# Patient Record
Sex: FEMALE | Race: WHITE | URBAN - METROPOLITAN AREA
[De-identification: names, ages, dates, MRNs, and addresses within clinical notes are randomized per-mention and may not be internally consistent; named-entity substitution may affect disease eponyms.]

---

## 2017-12-16 ENCOUNTER — INPATIENT (INPATIENT)
Facility: HOSPITAL | Age: 82
LOS: 2 days | Discharge: HOME | DRG: 389 | End: 2017-12-19
Attending: SPECIALIST | Admitting: SPECIALIST
Payer: MEDICARE

## 2017-12-16 VITALS
DIASTOLIC BLOOD PRESSURE: 92 MMHG | OXYGEN SATURATION: 90 % | SYSTOLIC BLOOD PRESSURE: 149 MMHG | HEART RATE: 81 BPM | WEIGHT: 143.08 LBS | RESPIRATION RATE: 18 BRPM | TEMPERATURE: 95 F | HEIGHT: 65 IN

## 2017-12-16 DIAGNOSIS — Z98.62 PERIPHERAL VASCULAR ANGIOPLASTY STATUS: Chronic | ICD-10-CM

## 2017-12-16 DIAGNOSIS — I25.10 ATHEROSCLEROTIC HEART DISEASE OF NATIVE CORONARY ARTERY WITHOUT ANGINA PECTORIS: ICD-10-CM

## 2017-12-16 DIAGNOSIS — K56.609 UNSPECIFIED INTESTINAL OBSTRUCTION, UNSPECIFIED AS TO PARTIAL VERSUS COMPLETE OBSTRUCTION: ICD-10-CM

## 2017-12-16 DIAGNOSIS — E78.2 MIXED HYPERLIPIDEMIA: ICD-10-CM

## 2017-12-16 DIAGNOSIS — Z29.9 ENCOUNTER FOR PROPHYLACTIC MEASURES, UNSPECIFIED: ICD-10-CM

## 2017-12-16 DIAGNOSIS — E03.9 HYPOTHYROIDISM, UNSPECIFIED: ICD-10-CM

## 2017-12-16 DIAGNOSIS — Z71.89 OTHER SPECIFIED COUNSELING: ICD-10-CM

## 2017-12-16 DIAGNOSIS — Z98.890 OTHER SPECIFIED POSTPROCEDURAL STATES: Chronic | ICD-10-CM

## 2017-12-16 LAB
ALBUMIN SERPL ELPH-MCNC: 4.3 G/DL — SIGNIFICANT CHANGE UP (ref 3.3–5)
ALP SERPL-CCNC: 84 U/L — SIGNIFICANT CHANGE UP (ref 40–120)
ALT FLD-CCNC: 20 U/L — SIGNIFICANT CHANGE UP (ref 12–78)
AMYLASE P1 CFR SERPL: 92 U/L — SIGNIFICANT CHANGE UP (ref 25–115)
ANION GAP SERPL CALC-SCNC: 15 MMOL/L — SIGNIFICANT CHANGE UP (ref 5–17)
APPEARANCE UR: ABNORMAL
AST SERPL-CCNC: 20 U/L — SIGNIFICANT CHANGE UP (ref 15–37)
BACTERIA # UR AUTO: ABNORMAL
BASE EXCESS BLDA CALC-SCNC: -4.3 MMOL/L — LOW (ref -2–2)
BILIRUB SERPL-MCNC: 0.8 MG/DL — SIGNIFICANT CHANGE UP (ref 0.2–1.2)
BILIRUB UR-MCNC: NEGATIVE — SIGNIFICANT CHANGE UP
BLOOD GAS COMMENTS ARTERIAL: SIGNIFICANT CHANGE UP
BUN SERPL-MCNC: 24 MG/DL — HIGH (ref 7–23)
CALCIUM SERPL-MCNC: 10.5 MG/DL — HIGH (ref 8.5–10.1)
CHLORIDE SERPL-SCNC: 101 MMOL/L — SIGNIFICANT CHANGE UP (ref 96–108)
CO2 SERPL-SCNC: 20 MMOL/L — LOW (ref 22–31)
COLOR SPEC: YELLOW — SIGNIFICANT CHANGE UP
CREAT SERPL-MCNC: 1.2 MG/DL — SIGNIFICANT CHANGE UP (ref 0.5–1.3)
DIFF PNL FLD: ABNORMAL
EPI CELLS # UR: SIGNIFICANT CHANGE UP
GLUCOSE SERPL-MCNC: 179 MG/DL — HIGH (ref 70–99)
GLUCOSE UR QL: 50 MG/DL
HCO3 BLDA-SCNC: 21 MMOL/L — LOW (ref 23–27)
HCT VFR BLD CALC: 44.7 % — SIGNIFICANT CHANGE UP (ref 34.5–45)
HCT VFR BLD CALC: 50.2 % — HIGH (ref 34.5–45)
HGB BLD-MCNC: 14.2 G/DL — SIGNIFICANT CHANGE UP (ref 11.5–15.5)
HGB BLD-MCNC: 15.9 G/DL — HIGH (ref 11.5–15.5)
HOROWITZ INDEX BLDA+IHG-RTO: 21 — SIGNIFICANT CHANGE UP
KETONES UR-MCNC: ABNORMAL
LACTATE SERPL-SCNC: 1.8 MMOL/L — SIGNIFICANT CHANGE UP (ref 0.7–2)
LACTATE SERPL-SCNC: 3.6 MMOL/L — HIGH (ref 0.7–2)
LEUKOCYTE ESTERASE UR-ACNC: ABNORMAL
LIDOCAIN IGE QN: 288 U/L — SIGNIFICANT CHANGE UP (ref 73–393)
LIDOCAIN IGE QN: 706 U/L — HIGH (ref 73–393)
MCHC RBC-ENTMCNC: 29.9 PG — SIGNIFICANT CHANGE UP (ref 27–34)
MCHC RBC-ENTMCNC: 30.5 PG — SIGNIFICANT CHANGE UP (ref 27–34)
MCHC RBC-ENTMCNC: 31.7 GM/DL — LOW (ref 32–36)
MCHC RBC-ENTMCNC: 31.7 GM/DL — LOW (ref 32–36)
MCV RBC AUTO: 94.3 FL — SIGNIFICANT CHANGE UP (ref 80–100)
MCV RBC AUTO: 96 FL — SIGNIFICANT CHANGE UP (ref 80–100)
NITRITE UR-MCNC: NEGATIVE — SIGNIFICANT CHANGE UP
PCO2 BLDA: 29 MMHG — LOW (ref 32–46)
PH BLDA: 7.44 — SIGNIFICANT CHANGE UP (ref 7.35–7.45)
PH UR: 7 — SIGNIFICANT CHANGE UP (ref 5–8)
PLATELET # BLD AUTO: 280 K/UL — SIGNIFICANT CHANGE UP (ref 150–400)
PLATELET # BLD AUTO: 317 K/UL — SIGNIFICANT CHANGE UP (ref 150–400)
PO2 BLDA: 48 MMHG — CRITICAL LOW (ref 74–108)
POTASSIUM SERPL-MCNC: 4.3 MMOL/L — SIGNIFICANT CHANGE UP (ref 3.5–5.3)
POTASSIUM SERPL-SCNC: 4.3 MMOL/L — SIGNIFICANT CHANGE UP (ref 3.5–5.3)
PROT SERPL-MCNC: 8.3 G/DL — SIGNIFICANT CHANGE UP (ref 6–8.3)
PROT UR-MCNC: 25 MG/DL
RBC # BLD: 4.65 M/UL — SIGNIFICANT CHANGE UP (ref 3.8–5.2)
RBC # BLD: 5.33 M/UL — HIGH (ref 3.8–5.2)
RBC # FLD: 13.4 % — SIGNIFICANT CHANGE UP (ref 10.3–14.5)
RBC # FLD: 13.6 % — SIGNIFICANT CHANGE UP (ref 10.3–14.5)
RBC CASTS # UR COMP ASSIST: ABNORMAL /HPF (ref 0–4)
SAO2 % BLDA: 83 % — LOW (ref 92–96)
SODIUM SERPL-SCNC: 136 MMOL/L — SIGNIFICANT CHANGE UP (ref 135–145)
SP GR SPEC: 1 — LOW (ref 1.01–1.02)
TROPONIN I SERPL-MCNC: 0.22 NG/ML — HIGH (ref 0.01–0.04)
UROBILINOGEN FLD QL: NEGATIVE — SIGNIFICANT CHANGE UP
WBC # BLD: 15.8 K/UL — HIGH (ref 3.8–10.5)
WBC # BLD: 19 K/UL — HIGH (ref 3.8–10.5)
WBC # FLD AUTO: 15.8 K/UL — HIGH (ref 3.8–10.5)
WBC # FLD AUTO: 19 K/UL — HIGH (ref 3.8–10.5)
WBC UR QL: ABNORMAL

## 2017-12-16 PROCEDURE — 74177 CT ABD & PELVIS W/CONTRAST: CPT | Mod: 26,59

## 2017-12-16 PROCEDURE — 99285 EMERGENCY DEPT VISIT HI MDM: CPT

## 2017-12-16 PROCEDURE — 74000: CPT | Mod: 26

## 2017-12-16 PROCEDURE — 71010: CPT | Mod: 26

## 2017-12-16 PROCEDURE — 93010 ELECTROCARDIOGRAM REPORT: CPT

## 2017-12-16 PROCEDURE — 71275 CT ANGIOGRAPHY CHEST: CPT | Mod: 26

## 2017-12-16 PROCEDURE — 74174 CTA ABD&PLVS W/CONTRAST: CPT | Mod: 26

## 2017-12-16 RX ORDER — METOPROLOL TARTRATE 50 MG
0 TABLET ORAL
Qty: 0 | Refills: 0 | COMMUNITY

## 2017-12-16 RX ORDER — CIPROFLOXACIN LACTATE 400MG/40ML
400 VIAL (ML) INTRAVENOUS EVERY 12 HOURS
Qty: 0 | Refills: 0 | Status: DISCONTINUED | OUTPATIENT
Start: 2017-12-16 | End: 2017-12-18

## 2017-12-16 RX ORDER — LEVOTHYROXINE SODIUM 125 MCG
25 TABLET ORAL ONCE
Qty: 0 | Refills: 0 | Status: COMPLETED | OUTPATIENT
Start: 2017-12-16 | End: 2017-12-16

## 2017-12-16 RX ORDER — ALPRAZOLAM 0.25 MG
1 TABLET ORAL
Qty: 0 | Refills: 0 | COMMUNITY

## 2017-12-16 RX ORDER — MORPHINE SULFATE 50 MG/1
2 CAPSULE, EXTENDED RELEASE ORAL ONCE
Qty: 0 | Refills: 0 | Status: DISCONTINUED | OUTPATIENT
Start: 2017-12-16 | End: 2017-12-16

## 2017-12-16 RX ORDER — TRAMADOL HYDROCHLORIDE 50 MG/1
0 TABLET ORAL
Qty: 0 | Refills: 0 | COMMUNITY

## 2017-12-16 RX ORDER — LEVOTHYROXINE SODIUM 125 MCG
1 TABLET ORAL
Qty: 0 | Refills: 0 | COMMUNITY

## 2017-12-16 RX ORDER — ONDANSETRON 8 MG/1
4 TABLET, FILM COATED ORAL EVERY 4 HOURS
Qty: 0 | Refills: 0 | Status: DISCONTINUED | OUTPATIENT
Start: 2017-12-16 | End: 2017-12-16

## 2017-12-16 RX ORDER — HEPARIN SODIUM 5000 [USP'U]/ML
5000 INJECTION INTRAVENOUS; SUBCUTANEOUS ONCE
Qty: 0 | Refills: 0 | Status: COMPLETED | OUTPATIENT
Start: 2017-12-16 | End: 2017-12-16

## 2017-12-16 RX ORDER — METOPROLOL TARTRATE 50 MG
2.5 TABLET ORAL EVERY 8 HOURS
Qty: 0 | Refills: 0 | Status: DISCONTINUED | OUTPATIENT
Start: 2017-12-16 | End: 2017-12-18

## 2017-12-16 RX ORDER — POLYETHYLENE GLYCOL 3350 17 G/17G
17 POWDER, FOR SOLUTION ORAL
Qty: 0 | Refills: 0 | COMMUNITY

## 2017-12-16 RX ORDER — OMEPRAZOLE 10 MG/1
0 CAPSULE, DELAYED RELEASE ORAL
Qty: 0 | Refills: 0 | COMMUNITY

## 2017-12-16 RX ORDER — TRAMADOL HYDROCHLORIDE 50 MG/1
1 TABLET ORAL
Qty: 0 | Refills: 0 | COMMUNITY

## 2017-12-16 RX ORDER — HYDROMORPHONE HYDROCHLORIDE 2 MG/ML
0.5 INJECTION INTRAMUSCULAR; INTRAVENOUS; SUBCUTANEOUS ONCE
Qty: 0 | Refills: 0 | Status: DISCONTINUED | OUTPATIENT
Start: 2017-12-16 | End: 2017-12-16

## 2017-12-16 RX ORDER — SODIUM CHLORIDE 9 MG/ML
1000 INJECTION, SOLUTION INTRAVENOUS
Qty: 0 | Refills: 0 | Status: DISCONTINUED | OUTPATIENT
Start: 2017-12-16 | End: 2017-12-17

## 2017-12-16 RX ORDER — METRONIDAZOLE 500 MG
500 TABLET ORAL ONCE
Qty: 0 | Refills: 0 | Status: COMPLETED | OUTPATIENT
Start: 2017-12-16 | End: 2017-12-16

## 2017-12-16 RX ORDER — SODIUM CHLORIDE 9 MG/ML
1000 INJECTION INTRAMUSCULAR; INTRAVENOUS; SUBCUTANEOUS ONCE
Qty: 0 | Refills: 0 | Status: COMPLETED | OUTPATIENT
Start: 2017-12-16 | End: 2017-12-16

## 2017-12-16 RX ORDER — LEVOTHYROXINE SODIUM 125 MCG
25 TABLET ORAL DAILY
Qty: 0 | Refills: 0 | Status: DISCONTINUED | OUTPATIENT
Start: 2017-12-17 | End: 2017-12-18

## 2017-12-16 RX ORDER — ISOSORBIDE MONONITRATE 60 MG/1
1 TABLET, EXTENDED RELEASE ORAL
Qty: 0 | Refills: 0 | COMMUNITY

## 2017-12-16 RX ORDER — OMEPRAZOLE 10 MG/1
1 CAPSULE, DELAYED RELEASE ORAL
Qty: 0 | Refills: 0 | COMMUNITY

## 2017-12-16 RX ORDER — ASPIRIN/CALCIUM CARB/MAGNESIUM 324 MG
1 TABLET ORAL
Qty: 0 | Refills: 0 | COMMUNITY

## 2017-12-16 RX ORDER — ASPIRIN/CALCIUM CARB/MAGNESIUM 324 MG
300 TABLET ORAL DAILY
Qty: 0 | Refills: 0 | Status: DISCONTINUED | OUTPATIENT
Start: 2017-12-16 | End: 2017-12-18

## 2017-12-16 RX ORDER — ISOSORBIDE DINITRATE 5 MG/1
0 TABLET ORAL
Qty: 0 | Refills: 0 | COMMUNITY

## 2017-12-16 RX ORDER — ALPRAZOLAM 0.25 MG
0 TABLET ORAL
Qty: 0 | Refills: 0 | COMMUNITY

## 2017-12-16 RX ORDER — METOPROLOL TARTRATE 50 MG
1 TABLET ORAL
Qty: 0 | Refills: 0 | COMMUNITY

## 2017-12-16 RX ADMIN — Medication 100 MILLIGRAM(S): at 07:19

## 2017-12-16 RX ADMIN — MORPHINE SULFATE 2 MILLIGRAM(S): 50 CAPSULE, EXTENDED RELEASE ORAL at 04:25

## 2017-12-16 RX ADMIN — Medication 2.5 MILLIGRAM(S): at 18:00

## 2017-12-16 RX ADMIN — HYDROMORPHONE HYDROCHLORIDE 0.5 MILLIGRAM(S): 2 INJECTION INTRAMUSCULAR; INTRAVENOUS; SUBCUTANEOUS at 04:30

## 2017-12-16 RX ADMIN — SODIUM CHLORIDE 50 MILLILITER(S): 9 INJECTION, SOLUTION INTRAVENOUS at 11:07

## 2017-12-16 RX ADMIN — ONDANSETRON 4 MILLIGRAM(S): 8 TABLET, FILM COATED ORAL at 11:04

## 2017-12-16 RX ADMIN — SODIUM CHLORIDE 1000 MILLILITER(S): 9 INJECTION INTRAMUSCULAR; INTRAVENOUS; SUBCUTANEOUS at 06:00

## 2017-12-16 RX ADMIN — HEPARIN SODIUM 5000 UNIT(S): 5000 INJECTION INTRAVENOUS; SUBCUTANEOUS at 22:45

## 2017-12-16 RX ADMIN — SODIUM CHLORIDE 50 MILLILITER(S): 9 INJECTION, SOLUTION INTRAVENOUS at 14:19

## 2017-12-16 RX ADMIN — Medication 2.5 MILLIGRAM(S): at 22:45

## 2017-12-16 RX ADMIN — Medication 300 MILLIGRAM(S): at 13:24

## 2017-12-16 RX ADMIN — Medication 25 MICROGRAM(S): at 13:24

## 2017-12-16 RX ADMIN — Medication 200 MILLIGRAM(S): at 18:00

## 2017-12-16 RX ADMIN — Medication 200 MILLIGRAM(S): at 06:03

## 2017-12-16 RX ADMIN — SODIUM CHLORIDE 1000 MILLILITER(S): 9 INJECTION INTRAMUSCULAR; INTRAVENOUS; SUBCUTANEOUS at 06:11

## 2017-12-16 RX ADMIN — HYDROMORPHONE HYDROCHLORIDE 0.5 MILLIGRAM(S): 2 INJECTION INTRAMUSCULAR; INTRAVENOUS; SUBCUTANEOUS at 05:32

## 2017-12-16 RX ADMIN — MORPHINE SULFATE 2 MILLIGRAM(S): 50 CAPSULE, EXTENDED RELEASE ORAL at 05:32

## 2017-12-16 RX ADMIN — SODIUM CHLORIDE 1000 MILLILITER(S): 9 INJECTION INTRAMUSCULAR; INTRAVENOUS; SUBCUTANEOUS at 03:55

## 2017-12-16 NOTE — ED ADULT NURSE REASSESSMENT NOTE - NS ED NURSE REASSESS COMMENT FT1
Spoke with Cas from respiratory regarding ABG. RT states it may be a mixed sample. Was told to place patient on oxygen per ABG result. MD Sotelo made aware. Sat of 98% on venti mask 4L.

## 2017-12-16 NOTE — CONSULT NOTE ADULT - ATTENDING COMMENTS
all of above authored by me   at high risk given age and multiple med problems however medically in optimal cond for emergent surgery  see cardio note  hold zofran with qt prolongation all of above authored by me      had epigastric pain earlier suspect  due to vomiting as occurred after multiple episodes vomiting,   at high risk given age and multiple med problems,  in optimal condition for emergent surgery pending cardio clearance  hold zofran with qt prolongation all of above authored by me      had epigastric pain earlier suspect  due to vomiting as occurred after multiple episodes vomiting,   at high risk given age and multiple med problems,  in optimal condition for emergent surgery once deemed stable by dr wagoner  cardiac enzymes ordered pending  hold zofran with qt prolongation all of above authored by me      had epigastric pain earlier suspect  due to vomiting as occurred after multiple episodes vomiting,   at high risk given age and multiple med problems, however   in optimal condition for emergent surgery once deemed stable by dr wagoner  cardiac enzymes ordered pending  hold zofran with qt prolongation    addendum hcp is daughter   follow up ct scan no obstruction. all of above authored by me      had epigastric pain earlier suspect  due to vomiting as occurred after multiple episodes vomiting,   at high risk given age and multiple med problems, however   in optimal condition for emergent surgery once deemed stable by dr wagoner  cardiac enzymes ordered pending  hold zofran with qt prolongation lipase noted as well.     addendum hcp is daughter   follow up ct scan no obstruction. all of above authored by me      had epigastric pain earlier suspect  due to vomiting as occurred after multiple episodes vomiting,   at high risk given age and multiple med problems, however   in optimal condition for emergent surgery once deemed stable by dr wagoner  cardiac enzymes ordered pending  hold zofran with qt prolongation     addendum hcp is daughter   follow up ct scan noted, no obstruction. distended thick walled gallbladder. elevated lipase earlier now normal. abd us scheduled for am   will follow up  dw dr venegas and bandar.

## 2017-12-16 NOTE — H&P ADULT - NSHPPHYSICALEXAM_GEN_ALL_CORE
VS wnl   Afeb  NAD  Mild diffuse abdominal tenderness without guarding, mild distention  lactate 1.8  CT reviewed with radiologist.  Overnight reading mentioned mass encasing SB in right pelvis not necesssarily cause of SBO.  There is SBO in right pelvis.  Radiologist here now unsure if mass is real

## 2017-12-16 NOTE — PROVIDER CONTACT NOTE (CRITICAL VALUE NOTIFICATION) - ACTION/TREATMENT ORDERED:
pt placed on ventimask post abg draw
Doctors ordered cardiac enzymes, EKG and cardiac echo for the morning.

## 2017-12-16 NOTE — H&P ADULT - HISTORY OF PRESENT ILLNESS
onset 8 PM last night of diffuse abdominal pain and vomiting then retching....   No pain now after morphine and NG insertion,  Had abdominal pain and vomiting with diarrhea 1 month ago lasting one night.  She had diarrhea x 2 yesterday AM, no BM since.

## 2017-12-16 NOTE — CONSULT NOTE ADULT - SUBJECTIVE AND OBJECTIVE BOX
HISTORY OF PRESENT ILLNESS:   this is an 89 year old woman who presented with a small bowel obstruction today.   + abdominal pain and vomiting. She is also complaining of chest discomfort radiating to her back now.  Denied palpitations, lightheadedness, syncope.    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  Hiatal hernia: laparoscopic surgery repair  S/P hysterectomy: ZOË-BSO  S/P appendectomy  S/P tonsillectomy        meds at home:  aspirin 81mg po daily  levothyroxine 50mg po daily  lovastatin 40mg po daily  magnesium 250mg po daily  metoprolol er 25mg po daily  vitamin D daily  xanax .5mg qhs        FAMILY HISTORY:  Family history of colon cancer (Mother): mother, niece, cousin      SOCIAL HISTORY:    x Non-smoker  x social etoh        Allergies  codeine (Unknown)  penicillin (Unknown)  	    REVIEW OF SYSTEMS:    CONSTITUTIONAL: No fever  EYES: No eye pain, visual disturbances, or discharge  ENMT:  No difficulty hearing, tinnitus, vertigo; No sinus or throat pain  NECK: No pain or stiffness  BREASTS: No pain, masses, or nipple discharge  RESPIRATORY: No cough, wheezing, chills or hemoptysis; No Shortness of Breath  CARDIOVASCULAR: see above  GASTROINTESTINAL: see above  GENITOURINARY: No dysuria, frequency, hematuria, or incontinence  NEUROLOGICAL: No headaches, memory loss, loss of strength, numbness, or tremors  SKIN: No itching, burning, rashes, or lesions   LYMPH Nodes: No enlarged glands  ENDOCRINE: No heat or cold intolerance; No hair loss  MUSCULOSKELETAL: No joint pain or swelling; No muscle, back, or extremity pain  PSYCHIATRIC: No depression, anxiety, mood swings, or difficulty sleeping  HEME/LYMPH: No easy bruising, or bleeding gums  ALLERY AND IMMUNOLOGIC: No hives or eczema	      PHYSICAL EXAM:  Daily Height in cm: 165.1 (16 Dec 2017 02:19)    Daily   T(C): 36.3 (12-16-17 @ 07:18), Max: 36.7 (12-16-17 @ 06:19)  HR: 82 (12-16-17 @ 07:18) (81 - 84)  BP: 160/80 (12-16-17 @ 07:18) (149/92 - 164/86)  RR: 17 (12-16-17 @ 07:18) (17 - 22)  SpO2: 100% (12-16-17 @ 07:18) (90% - 100%)  Wt(kg): --  I&O's Summary      Appearance: Normal	  HEENT:   Normal oral mucosa, PERRL, EOMI	  Lymphatic: No lymphadenopathy  Cardiovascular: Normal S1 S2, No JVD, No murmurs, No edema  Respiratory: Lungs clear to auscultation	  Psychiatry: A & O x 3, Mood & affect appropriate  Gastrointestinal:  Soft, Non-tender, + BS	  Skin: No rashes, No ecchymoses, No cyanosis	  Neurologic: Non-focal  Extremities: Normal range of motion, No clubbing, cyanosis or edema  Vascular: Peripheral pulses palpable 2+ bilaterally        ECG:  nsr, poor r wave progression	  	  LABS:	 	                      14.2   15.8  )-----------( 280      ( 16 Dec 2017 09:46 )             44.7     12-16    136  |  101  |  24<H>  ----------------------------<  179<H>  4.3   |  20<L>  |  1.20    Ca    10.5<H>      16 Dec 2017 03:00    TPro  8.3  /  Alb  4.3  /  TBili  0.8  /  DBili  x   /  AST  20  /  ALT  20  /  AlkPhos  84  12-16      Nuclear stress test: 2016--no angina or ischemic ekg changes with adenosine protocol  Mild inferior perfusion defect, which may be artifact.  LV hypertrophy with LVEF 83%.    Echocardiogram: normal lv systolic function.  EF=65%.  No wall motion abnormalities. LVH  Mild TR          ASSESSMENT/PLAN:

## 2017-12-16 NOTE — ED ADULT NURSE NOTE - OBJECTIVE STATEMENT
A/ox4 patient BIBEMS for abdominal pain. Pain 10/10 with nausea and vomiting, abdominal distention. Per patient, pain began at 8pm last night. Afebrile, denies diarrhea. HX of divertic.

## 2017-12-16 NOTE — CONSULT NOTE ADULT - ASSESSMENT
89 female admitted with bowel obstruction sec to tumor  possible emergent surgery  today 89 female admitted with bowel obstruction sec to possible tumor  possible emergent surgery  today

## 2017-12-16 NOTE — ED PROVIDER NOTE - PSH
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

## 2017-12-16 NOTE — CONSULT NOTE ADULT - PROBLEM SELECTOR RECOMMENDATION 4
iv lopressor  rectal asa while np  resume statin when able to take po iv lopressor  rectal asa while npo  resume statin when diet advanced

## 2017-12-16 NOTE — H&P ADULT - PSH
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

## 2017-12-16 NOTE — ED PROVIDER NOTE - OBJECTIVE STATEMENT
88 y/o w/f h/o hiatal hernia,S/P appendectomy  S/P hysterectomy   Hyperlipidemia  Hypothyroidism.  C/O severe abdominal pain, nausea and retching since 8PM. No chest pain, no SOB, no fever, no chills, no diarrhea, no urinary symptoms, no GIB. 89 y/o w/f h/o hiatal hernia, S/P appendectomy  S/P hysterectomy   Hyperlipidemia  Hypothyroidism.  C/O severe abdominal pain, nausea and retching since 8PM. No chest pain, no SOB, no fever, no chills, no diarrhea, no urinary symptoms, no GIB.

## 2017-12-16 NOTE — H&P ADULT - ASSESSMENT
SBO with ? relation to possible mass.  Await med and cardio eval.  Will need laparotomy if SBO persists and especially if pain returns

## 2017-12-16 NOTE — CONSULT NOTE ADULT - SUBJECTIVE AND OBJECTIVE BOX
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 No nausea, vomiting, or hematemesis; No diarrhea or constipation. No melena or hematochezia.  GENITOURINARY: No dysuria, frequency, hematuria, or incontinence  NEUROLOGICAL: No headaches, memory loss, loss of strength, numbness, or tremors  SKIN: No itching, burning, rashes, or lesions   LYMPH NODES: No enlarged glands  ENDOCRINE: No heat or cold intolerance; No hair loss  MUSCULOSKELETAL: No muscle or back pain  PSYCHIATRIC: No depression, anxiety, mood swings, or difficulty sleeping  HEME/LYMPH: No easy bruising, or bleeding gums  ALLERGY AND IMMUNOLOGIC: No hives or eczema        home meds  sumtjrpod 50 romie d  asa 81 mg d  pravastatin 40 mg d  vitamin 2 1000 iu d  magnesium 500 mg d  xanax 0.5 mg d  isosorbide dinitrate 30 mg d  omeprazole 40 mg d  tramadol 50 mg q hs and in am prn      standing meds  aspirin Suppository 300 milliGRAM(s) Rectal daily  ciprofloxacin   IVPB 400 milliGRAM(s) IV Intermittent every 12 hours  dextrose 5% + sodium chloride 0.9%. 1000 milliLiter(s) (50 mL/Hr) IV Continuous <Continuous>    MEDICATIONS  (PRN):  ondansetron Injectable 4 milliGRAM(s) IV Push every 4 hours PRN Nausea and/or Vomiting      Allergies    codeine (Unknown)  penicillin (Unknown)    Intolerances        SOCIAL HISTORY  tobacco:non smoker  alcohol: none  substance use: none  marital status:         PHYSICAL EXAM  Height (cm): 165.1 ( @ 02:19)  Weight (kg): 64.9 ( @ 02:19)  BMI (kg/m2): 23.8 ( @ 02:19)  BSA (m2): 1.72 ( @ 02:19)  Vital Signs Last 24 Hrs  T(C): 36.3 (16 Dec 2017 07:18), Max: 36.7 (16 Dec 2017 06:19)  T(F): 97.4 (16 Dec 2017 07:18), Max: 98 (16 Dec 2017 06:19)  HR: 82 (16 Dec 2017 07:18) (81 - 84)  BP: 160/80 (16 Dec 2017 07:18) (149/92 - 164/86)  BP(mean): --  RR: 17 (16 Dec 2017 07:18) (17 - 22)  SpO2: 100% (16 Dec 2017 07:18) (90% - 100%)    GENERAL: NAD, well-groomed, well-developed  HEAD:  Atraumatic, Normocephalic  EYES: EOMI, PERRLA, conjunctiva and sclera clear  ENMT: No tonsillar erythema, exudates, or enlargement  NECK: Supple, No JVD  NERVOUS SYSTEM:  Alert & Oriented X3, Good concentration; Motor Strength 5/5 B/L upper and lower extremities; DTRs 2+ intact and symmetric  CHEST/LUNG: Clear to percussion bilaterally; No rales, rhonchi, wheezing, or rubs  HEART: Regular rate and rhythm; No murmurs, rubs, or gallops  ABDOMEN: Soft, Nontender, Nondistended; Bowel sounds present  EXTREMITIES:  2+ Peripheral Pulses, No clubbing, cyanosis, or edema  LYMPH: No lymphadenopathy noted  SKIN: No rashes or lesions      LABS:                        14.2   15.8  )-----------( 280      ( 16 Dec 2017 09:46 )             44.7     -    136  |  101  |  24<H>  ----------------------------<  179<H>  4.3   |  20<L>  |  1.20    Ca    10.5<H>      16 Dec 2017 03:00    TPro  8.3  /  Alb  4.3  /  TBili  0.8  /  DBili  x   /  AST  20  /  ALT  20  /  AlkPhos  84  12-16      Urinalysis Basic - ( 16 Dec 2017 06:12 )    Color: Yellow / Appearance: Slightly Turbid / S.005 / pH: x  Gluc: x / Ketone: Trace  / Bili: Negative / Urobili: Negative   Blood: x / Protein: 25 mg/dL / Nitrite: Negative   Leuk Esterase: Moderate / RBC: 6-10 /HPF / WBC 6-10   Sq Epi: x / Non Sq Epi: Occasional / Bacteria: Few      CAPILLARY BLOOD GLUCOSE          RADIOLOGY & ADDITIONAL STUDIES: Patient is a 89y old  Female who presents with a chief complaint of abd pain and vomiting (SBO) (16 Dec 2017 08:38)      HPI: 88 female pmh cad pci stents 4-5 years ago admitted with bowel obstruction sec to tumor    pmd: charlee portillo    PAST MEDICAL & SURGICAL HISTORY:  H/O diverticulitis of colon: x3  Hypothyroidism  Hyperlipidemia  H/O knee surgery: arthroscopy  H/O angioplasty: coronary, with stents 5 years ago  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 no palpitations, dizziness, or leg swelling  GASTROINTESTINAL: AT PRESENT: No abdominal or epigastric pain. No nausea, vomiting,  No diarrhea or constipation. No melena or hematochezia. had n/v earlier  GENITOURINARY: No dysuria, frequency, hematuria, or incontinence  NEUROLOGICAL: No headaches, memory loss, loss of strength, numbness, or tremors  SKIN: No itching, burning, rashes, or lesions   LYMPH NODES: No enlarged glands  ENDOCRINE: No heat or cold intolerance; No hair loss  MUSCULOSKELETAL: No muscle or back pain  PSYCHIATRIC: No depression, anxiety, mood swings, or difficulty sleeping  HEME/LYMPH: No easy bruising, or bleeding gums  ALLERGY AND IMMUNOLOGIC: No hives or eczema        home meds  sumtjrpod 50 romie d  asa 81 mg d  pravastatin 40 mg d  vitamin 2 1000 iu d  magnesium 500 mg d  xanax 0.5 mg d  isosorbide dinitrate 30 mg d  omeprazole 40 mg d  tramadol 50 mg q hs and in am prn      standing meds  aspirin Suppository 300 milliGRAM(s) Rectal daily  ciprofloxacin   IVPB 400 milliGRAM(s) IV Intermittent every 12 hours  dextrose 5% + sodium chloride 0.9%. 1000 milliLiter(s) (50 mL/Hr) IV Continuous <Continuous>    MEDICATIONS  (PRN):  ondansetron Injectable 4 milliGRAM(s) IV Push every 4 hours PRN Nausea and/or Vomiting      Allergies    codeine (Unknown)  penicillin (Unknown)    Intolerances        SOCIAL HISTORY  tobacco:non smoker  alcohol: none  substance use: none  marital status:         PHYSICAL EXAM  Height (cm): 165.1 ( @ 02:19)  Weight (kg): 64.9 ( @ 02:19)  BMI (kg/m2): 23.8 ( @ 02:19)  BSA (m2): 1.72 ( @ 02:19)  Vital Signs Last 24 Hrs  T(C): 36.3 (16 Dec 2017 07:18), Max: 36.7 (16 Dec 2017 06:19)  T(F): 97.4 (16 Dec 2017 07:18), Max: 98 (16 Dec 2017 06:19)  HR: 82 (16 Dec 2017 07:18) (81 - 84)  BP: 160/80 (16 Dec 2017 07:18) (149/92 - 164/86)  BP(mean): --  RR: 17 (16 Dec 2017 07:18) (17 - 22)  SpO2: 100% (16 Dec 2017 07:18) (90% - 100%) on 2 liters o2 95 percent on room air    GENERAL: NAD, well-groomed, well-developed  HEAD:  Atraumatic, Normocephalic  EYES: EOMI, PERRLA, conjunctiva and sclera clear  ENMT: No tonsillar erythema, exudates, or enlargement, ng tube in situ  NECK: Supple, No JVD  NERVOUS SYSTEM:  Alert & Oriented X3, no focal deficits  CHEST/LUNG: Clear to percussion bilaterally; No rales, rhonchi, wheezing, or rubs  HEART: Regular rate and rhythm; No murmurs, rubs, or gallops  ABDOMEN: at present Soft, mildy tender on palpation only  mid abdomen , Nondistended;   EXTREMITIES:  2+ Peripheral Pulses, No clubbing, cyanosis, or edema  LYMPH: No lymphadenopathy noted  SKIN: No rashes or lesions      LABS:                        14.2   15.8  )-----------( 280      ( 16 Dec 2017 09:46 )             44.7         136  |  101  |  24<H>  ----------------------------<  179<H>  4.3   |  20<L>  |  1.20    Ca    10.5<H>      16 Dec 2017 03:00    TPro  8.3  /  Alb  4.3  /  TBili  0.8  /  DBili  x   /  AST  20  /  ALT  20  /  AlkPhos  84        Urinalysis Basic - ( 16 Dec 2017 06:12 )    Color: Yellow / Appearance: Slightly Turbid / S.005 / pH: x  Gluc: x / Ketone: Trace  / Bili: Negative / Urobili: Negative   Blood: x / Protein: 25 mg/dL / Nitrite: Negative   Leuk Esterase: Moderate / RBC: 6-10 /HPF / WBC 6-10   Sq Epi: x / Non Sq Epi: Occasional / Bacteria: Few      CAPILLARY BLOOD GLUCOSE          RADIOLOGY & ADDITIONAL STUDIES: Patient is a 89y old  Female who presents with a chief complaint of abd pain and vomiting (SBO) (16 Dec 2017 08:38)      HPI: 88 female pmh cad pci stents 4-5 years ago admitted with bowel obstruction sec to tumor    pmd: charlee portillo    PAST MEDICAL & SURGICAL HISTORY:  H/O diverticulitis of colon: x3  Hypothyroidism  Hyperlipidemia  H/O knee surgery: arthroscopy  H/O angioplasty: coronary, with stents 5 years ago  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 no palpitations, dizziness, or leg swelling  GASTROINTESTINAL: AT PRESENT: No abdominal or epigastric pain. No nausea, vomiting,  No diarrhea or constipation. No melena or hematochezia. had n/v earlier  GENITOURINARY: No dysuria, frequency, hematuria, or incontinence  NEUROLOGICAL: No headaches, memory loss, loss of strength, numbness, or tremors  SKIN: No itching, burning, rashes, or lesions   LYMPH NODES: No enlarged glands  ENDOCRINE: No heat or cold intolerance; No hair loss  MUSCULOSKELETAL: No muscle or back pain  PSYCHIATRIC: No depression, anxiety, mood swings, or difficulty sleeping  HEME/LYMPH: No easy bruising, or bleeding gums  ALLERGY AND IMMUNOLOGIC: No hives or eczema        home meds  sumtjrpod 50 romie d  asa 81 mg d  pravastatin 40 mg d  vitamin 2 1000 iu d  magnesium 250 mg d  xanax 0.5 mg hs  imdur  30 mg d  omeprazole 40 mg d  tramadol 50 mg q hs and in am prn      standing meds  aspirin Suppository 300 milliGRAM(s) Rectal daily  ciprofloxacin   IVPB 400 milliGRAM(s) IV Intermittent every 12 hours  dextrose 5% + sodium chloride 0.9%. 1000 milliLiter(s) (50 mL/Hr) IV Continuous <Continuous>    MEDICATIONS  (PRN):  ondansetron Injectable 4 milliGRAM(s) IV Push every 4 hours PRN Nausea and/or Vomiting      Allergies    codeine (Unknown)  penicillin (Unknown)    Intolerances        SOCIAL HISTORY  tobacco:non smoker  alcohol: none  substance use: none  marital status:         PHYSICAL EXAM  Height (cm): 165.1 ( @ 02:19)  Weight (kg): 64.9 ( @ 02:19)  BMI (kg/m2): 23.8 ( @ 02:19)  BSA (m2): 1.72 ( @ 02:19)  Vital Signs Last 24 Hrs  T(C): 36.3 (16 Dec 2017 07:18), Max: 36.7 (16 Dec 2017 06:19)  T(F): 97.4 (16 Dec 2017 07:18), Max: 98 (16 Dec 2017 06:19)  HR: 82 (16 Dec 2017 07:18) (81 - 84)  BP: 160/80 (16 Dec 2017 07:18) (149/92 - 164/86)  BP(mean): --  RR: 17 (16 Dec 2017 07:18) (17 - 22)  SpO2: 100% (16 Dec 2017 07:18) (90% - 100%) on 2 liters o2 95 percent on room air    GENERAL: NAD, well-groomed, well-developed  HEAD:  Atraumatic, Normocephalic  EYES: EOMI, PERRLA, conjunctiva and sclera clear  ENMT: No tonsillar erythema, exudates, or enlargement, ng tube in situ  NECK: Supple, No JVD  NERVOUS SYSTEM:  Alert & Oriented X3, no focal deficits  CHEST/LUNG: Clear to percussion bilaterally; No rales, rhonchi, wheezing, or rubs  HEART: Regular rate and rhythm; No murmurs, rubs, or gallops  ABDOMEN: at present Soft, mildy tender on palpation only  mid abdomen , Nondistended;   EXTREMITIES:  2+ Peripheral Pulses, No clubbing, cyanosis, or edema  LYMPH: No lymphadenopathy noted  SKIN: No rashes or lesions      LABS:                        14.2   15.8  )-----------( 280      ( 16 Dec 2017 09:46 )             44.7     12-16    136  |  101  |  24<H>  ----------------------------<  179<H>  4.3   |  20<L>  |  1.20    Ca    10.5<H>      16 Dec 2017 03:00    TPro  8.3  /  Alb  4.3  /  TBili  0.8  /  DBili  x   /  AST  20  /  ALT  20  /  AlkPhos  84  12-16      Urinalysis Basic - ( 16 Dec 2017 06:12 )    Color: Yellow / Appearance: Slightly Turbid / S.005 / pH: x  Gluc: x / Ketone: Trace  / Bili: Negative / Urobili: Negative   Blood: x / Protein: 25 mg/dL / Nitrite: Negative   Leuk Esterase: Moderate / RBC: 6-10 /HPF / WBC 6-10   Sq Epi: x / Non Sq Epi: Occasional / Bacteria: Few      CAPILLARY BLOOD GLUCOSE          RADIOLOGY & ADDITIONAL STUDIES:

## 2017-12-16 NOTE — CONSULT NOTE ADULT - ASSESSMENT
1) small bowel obstruction  management as per surgery  continue beta blockers    2) chest pain radiating to back  ct chest/abd/pelvis--rule out dissection, however, patient's family is refusing at this.  serial cardiac enzymes    3) cad (stents)    4) hypothyroidism  continue present meds    5) hyperchol  continue present meds

## 2017-12-16 NOTE — ED PROVIDER NOTE - SIGNIFICANT NEGATIVE FINDINGS
no headache, no chest pain, no SOB, no palpitations, no vomiting, no diarrhea, no urinary symptoms, no GI bleeding. no neuro changes.

## 2017-12-17 LAB
ALBUMIN SERPL ELPH-MCNC: 3.3 G/DL — SIGNIFICANT CHANGE UP (ref 3.3–5)
ALP SERPL-CCNC: 62 U/L — SIGNIFICANT CHANGE UP (ref 40–120)
ALT FLD-CCNC: 16 U/L — SIGNIFICANT CHANGE UP (ref 12–78)
AMYLASE P1 CFR SERPL: 38 U/L — SIGNIFICANT CHANGE UP (ref 25–115)
ANION GAP SERPL CALC-SCNC: 9 MMOL/L — SIGNIFICANT CHANGE UP (ref 5–17)
AST SERPL-CCNC: 19 U/L — SIGNIFICANT CHANGE UP (ref 15–37)
BILIRUB DIRECT SERPL-MCNC: 0.2 MG/DL — SIGNIFICANT CHANGE UP (ref 0.05–0.2)
BILIRUB INDIRECT FLD-MCNC: 0.5 MG/DL — SIGNIFICANT CHANGE UP (ref 0.2–1)
BILIRUB SERPL-MCNC: 0.7 MG/DL — SIGNIFICANT CHANGE UP (ref 0.2–1.2)
BUN SERPL-MCNC: 11 MG/DL — SIGNIFICANT CHANGE UP (ref 7–23)
CALCIUM SERPL-MCNC: 8.5 MG/DL — SIGNIFICANT CHANGE UP (ref 8.5–10.1)
CHLORIDE SERPL-SCNC: 108 MMOL/L — SIGNIFICANT CHANGE UP (ref 96–108)
CHOLEST SERPL-MCNC: 133 MG/DL — SIGNIFICANT CHANGE UP (ref 10–199)
CK MB BLD-MCNC: 2.4 % — SIGNIFICANT CHANGE UP (ref 0–3.5)
CK MB CFR SERPL CALC: 2.1 NG/ML — SIGNIFICANT CHANGE UP (ref 0–3.6)
CK SERPL-CCNC: 89 U/L — SIGNIFICANT CHANGE UP (ref 26–192)
CO2 SERPL-SCNC: 26 MMOL/L — SIGNIFICANT CHANGE UP (ref 22–31)
CREAT SERPL-MCNC: 1.1 MG/DL — SIGNIFICANT CHANGE UP (ref 0.5–1.3)
CULTURE RESULTS: SIGNIFICANT CHANGE UP
GLUCOSE SERPL-MCNC: 99 MG/DL — SIGNIFICANT CHANGE UP (ref 70–99)
HCT VFR BLD CALC: 43.3 % — SIGNIFICANT CHANGE UP (ref 34.5–45)
HDLC SERPL-MCNC: 43 MG/DL — SIGNIFICANT CHANGE UP (ref 40–125)
HGB BLD-MCNC: 13.6 G/DL — SIGNIFICANT CHANGE UP (ref 11.5–15.5)
LIDOCAIN IGE QN: 99 U/L — SIGNIFICANT CHANGE UP (ref 73–393)
LIPID PNL WITH DIRECT LDL SERPL: 65 MG/DL — SIGNIFICANT CHANGE UP
MCHC RBC-ENTMCNC: 30.2 PG — SIGNIFICANT CHANGE UP (ref 27–34)
MCHC RBC-ENTMCNC: 31.3 GM/DL — LOW (ref 32–36)
MCV RBC AUTO: 96.4 FL — SIGNIFICANT CHANGE UP (ref 80–100)
PLATELET # BLD AUTO: 270 K/UL — SIGNIFICANT CHANGE UP (ref 150–400)
POTASSIUM SERPL-MCNC: 3.7 MMOL/L — SIGNIFICANT CHANGE UP (ref 3.5–5.3)
POTASSIUM SERPL-SCNC: 3.7 MMOL/L — SIGNIFICANT CHANGE UP (ref 3.5–5.3)
PROT SERPL-MCNC: 6.7 G/DL — SIGNIFICANT CHANGE UP (ref 6–8.3)
RBC # BLD: 4.49 M/UL — SIGNIFICANT CHANGE UP (ref 3.8–5.2)
RBC # FLD: 14 % — SIGNIFICANT CHANGE UP (ref 10.3–14.5)
SODIUM SERPL-SCNC: 143 MMOL/L — SIGNIFICANT CHANGE UP (ref 135–145)
SPECIMEN SOURCE: SIGNIFICANT CHANGE UP
TOTAL CHOLESTEROL/HDL RATIO MEASUREMENT: 3.1 RATIO — LOW (ref 3.3–7.1)
TRIGL SERPL-MCNC: 127 MG/DL — SIGNIFICANT CHANGE UP (ref 10–149)
TROPONIN I SERPL-MCNC: 0.21 NG/ML — HIGH (ref 0.01–0.04)
TROPONIN I SERPL-MCNC: 0.22 NG/ML — HIGH (ref 0.01–0.04)
WBC # BLD: 11.8 K/UL — HIGH (ref 3.8–10.5)
WBC # FLD AUTO: 11.8 K/UL — HIGH (ref 3.8–10.5)

## 2017-12-17 PROCEDURE — 93010 ELECTROCARDIOGRAM REPORT: CPT

## 2017-12-17 PROCEDURE — 76705 ECHO EXAM OF ABDOMEN: CPT | Mod: 26

## 2017-12-17 RX ADMIN — Medication 200 MILLIGRAM(S): at 18:18

## 2017-12-17 RX ADMIN — Medication 2.5 MILLIGRAM(S): at 05:03

## 2017-12-17 RX ADMIN — Medication 300 MILLIGRAM(S): at 13:06

## 2017-12-17 RX ADMIN — Medication 2.5 MILLIGRAM(S): at 21:57

## 2017-12-17 RX ADMIN — Medication 200 MILLIGRAM(S): at 05:03

## 2017-12-17 RX ADMIN — Medication 25 MICROGRAM(S): at 05:03

## 2017-12-17 NOTE — PROGRESS NOTE ADULT - PROBLEM SELECTOR PLAN 1
seems more likely to have been ileus, perhaps secondary to acute biliary issues  await HIDA.  cardio eval noted and appreciated.  there are no medical contraindications to surgical intervention

## 2017-12-17 NOTE — PROGRESS NOTE ADULT - PROBLEM SELECTOR PLAN 5
health care proxy forms placed in chart.  prognosis and long term care plans discussed with pt and family.  delineated agent listed.

## 2017-12-17 NOTE — PROGRESS NOTE ADULT - SUBJECTIVE AND OBJECTIVE BOX
The patient has no chest pain, no SOB.  She is still having some nausea.  No back pain.    Appearance: Normal	  HEENT:   Normal oral mucosa, PERRL, EOMI	  Neck:  No bruits; No JVD  Cardiovascular: Normal S1 S2 regular, 2/6 systolic best at apex  Respiratory: A/E good bilateral, bronchovesicular BS  Gastrointestinal:  Soft, no distention, + BS, mild tenderness to palpation  Skin: No rashes, No ecchymoses, No cyanosis	  Neurologic: no focal neurologic deficits  Extremities: Normal range of motion, No clubbing, cyanosis or edema  Vascular: Peripheral pulses palpable 2+ bilaterally

## 2017-12-17 NOTE — PROGRESS NOTE ADULT - SUBJECTIVE AND OBJECTIVE BOX
Afeb VS wnl  No nausea or pain.  Passing flatus.  Abd: nontender, nondistended  WBC down to 11.8  CT shows SBO resolved  Sono : thickwalled GB with no stones  P: clear liquids, HIDA scan in AM

## 2017-12-17 NOTE — PROGRESS NOTE ADULT - SUBJECTIVE AND OBJECTIVE BOX
Patient is a 89y old  Female who presents with a chief complaint of abd pain and vomiting (SBO) (16 Dec 2017 08:38)  feels well presently.      INTERVAL HPI/OVERNIGHT EVENTS:  T(C): 36.8 (17 @ 07:37), Max: 37.1 (17 @ 16:40)  HR: 62 (17 @ 07:37) (62 - 84)  BP: 137/80 (17 @ 07:37) (118/70 - 162/84)  RR: 16 (17 @ 07:37) (15 - 16)  SpO2: 94% (17 @ 07:37) (92% - 100%)  Wt(kg): --  I&O's Summary    16 Dec 2017 07:01  -  17 Dec 2017 07:00  --------------------------------------------------------  IN: 1000 mL / OUT: 550 mL / NET: 450 mL        LABS:                        14.2   15.8  )-----------( 280      ( 16 Dec 2017 09:46 )             44.7     12-16    136  |  101  |  24<H>  ----------------------------<  179<H>  4.3   |  20<L>  |  1.20    Ca    10.5<H>      16 Dec 2017 03:00    TPro  8.3  /  Alb  4.3  /  TBili  0.8  /  DBili  x   /  AST  20  /  ALT  20  /  AlkPhos  84  12-16      Urinalysis Basic - ( 16 Dec 2017 06:12 )    Color: Yellow / Appearance: Slightly Turbid / S.005 / pH: x  Gluc: x / Ketone: Trace  / Bili: Negative / Urobili: Negative   Blood: x / Protein: 25 mg/dL / Nitrite: Negative   Leuk Esterase: Moderate / RBC: 6-10 /HPF / WBC 6-10   Sq Epi: x / Non Sq Epi: Occasional / Bacteria: Few      CAPILLARY BLOOD GLUCOSE        ABG - ( 16 Dec 2017 04:07 )  pH: 7.44  /  pCO2: 29    /  pO2: 48    / HCO3: 21    / Base Excess: -4.3  /  SaO2: 83                  Urinalysis Basic - ( 16 Dec 2017 06:12 )    Color: Yellow / Appearance: Slightly Turbid / S.005 / pH: x  Gluc: x / Ketone: Trace  / Bili: Negative / Urobili: Negative   Blood: x / Protein: 25 mg/dL / Nitrite: Negative   Leuk Esterase: Moderate / RBC: 6-10 /HPF / WBC 6-10   Sq Epi: x / Non Sq Epi: Occasional / Bacteria: Few        MEDICATIONS  (STANDING):  aspirin Suppository 300 milliGRAM(s) Rectal daily  ciprofloxacin   IVPB 400 milliGRAM(s) IV Intermittent every 12 hours  dextrose 5% + sodium chloride 0.9%. 1000 milliLiter(s) (50 mL/Hr) IV Continuous <Continuous>  levothyroxine Injectable 25 MICROGram(s) IV Push daily  metoprolol    tartrate Injectable 2.5 milliGRAM(s) IV Push every 8 hours    MEDICATIONS  (PRN):      REVIEW OF SYSTEMS:  CONSTITUTIONAL: No fever, weight loss, or fatigue  EYES: No eye pain, visual disturbances, or discharge  ENMT:  No difficulty hearing, tinnitus, vertigo; No sinus or throat pain  NECK: No pain or stiffness  RESPIRATORY: No cough, wheezing, chills or hemoptysis; No shortness of breath  CARDIOVASCULAR: No chest pain, palpitations, dizziness, or leg swelling  GASTROINTESTINAL: no further abd pain presently  GENITOURINARY: No dysuria, frequency, hematuria, or incontinence  NEUROLOGICAL: No headaches, memory loss, loss of strength, numbness, or tremors  SKIN: No itching, burning, rashes, or lesions   LYMPH NODES: No enlarged glands  ENDOCRINE: No heat or cold intolerance; No hair loss  MUSCULOSKELETAL: No joint pain or swelling; No muscle, back, or extremity pain  PSYCHIATRIC: No depression, anxiety, mood swings, or difficulty sleeping  HEME/LYMPH: No easy bruising, or bleeding gums  ALLERY AND IMMUNOLOGIC: No hives or eczema    RADIOLOGY & ADDITIONAL TESTS:    Imaging Personally Reviewed:  [ ] YES  [ ] NO    Consultant(s) Notes Reviewed:  [ ] YES  [ ] NO    PHYSICAL EXAM:  GENERAL: NAD, well-groomed, well-developed  HEAD:  Atraumatic, Normocephalic  EYES: EOMI, PERRLA, conjunctiva and sclera clear  ENMT: No tonsillar erythema, exudates, or enlargement; Moist mucous membranes, Good dentition, No lesions  NECK: Supple, No JVD, Normal thyroid  NERVOUS SYSTEM:  Alert & Oriented X3, Good concentration; Motor Strength 5/5 B/L upper and lower extremities; DTRs 2+ intact and symmetric  CHEST/LUNG: Clear to percussion bilaterally; No rales, rhonchi, wheezing, or rubs  HEART: Regular rate and rhythm; No murmurs, rubs, or gallops  ABDOMEN: Soft, Nontender, Nondistended; Bowel sounds present  EXTREMITIES:  2+ Peripheral Pulses, No clubbing, cyanosis, or edema  LYMPH: No lymphadenopathy noted  SKIN: No rashes or lesions    Care Discussed with Consultants/Other Providers [ ] YES  [ ] NO

## 2017-12-17 NOTE — PROGRESS NOTE ADULT - ASSESSMENT
1) small bowel obstruction  The pateint is still having some symptoms and signa of GB issues and possible SBO  F/U with surgery for possible surgical intervention.  The patient is cardiac cleared at low risk for General Anesthesia  Continue B-Blocker Rx.    2) chest pain radiating to back  The symptoms have resolved.  She has a history of back issues; furthermore, the GB problem may cause pain radiating to the back  ct chest/abd/pelvis--rule out dissection, however, patient's family is refusing at this.  serial cardiac enzymes    3) cad (stents)  No active symptoms of angina.  The small positive troponin I may be related to demand ischemia versus false + with GI obstruction    4) hypothyroidism  continue present meds    5) hyperlipiemia  continue present meds    Cardiac cleared at low risk for general anesthesia

## 2017-12-18 LAB
ALBUMIN SERPL ELPH-MCNC: 3.1 G/DL — LOW (ref 3.3–5)
ALP SERPL-CCNC: 60 U/L — SIGNIFICANT CHANGE UP (ref 40–120)
ALT FLD-CCNC: 15 U/L — SIGNIFICANT CHANGE UP (ref 12–78)
ANION GAP SERPL CALC-SCNC: 7 MMOL/L — SIGNIFICANT CHANGE UP (ref 5–17)
AST SERPL-CCNC: 19 U/L — SIGNIFICANT CHANGE UP (ref 15–37)
BILIRUB SERPL-MCNC: 0.7 MG/DL — SIGNIFICANT CHANGE UP (ref 0.2–1.2)
BUN SERPL-MCNC: 11 MG/DL — SIGNIFICANT CHANGE UP (ref 7–23)
CALCIUM SERPL-MCNC: 8.8 MG/DL — SIGNIFICANT CHANGE UP (ref 8.5–10.1)
CHLORIDE SERPL-SCNC: 109 MMOL/L — HIGH (ref 96–108)
CO2 SERPL-SCNC: 28 MMOL/L — SIGNIFICANT CHANGE UP (ref 22–31)
CREAT SERPL-MCNC: 0.94 MG/DL — SIGNIFICANT CHANGE UP (ref 0.5–1.3)
GLUCOSE SERPL-MCNC: 87 MG/DL — SIGNIFICANT CHANGE UP (ref 70–99)
HCT VFR BLD CALC: 40.5 % — SIGNIFICANT CHANGE UP (ref 34.5–45)
HGB BLD-MCNC: 12.8 G/DL — SIGNIFICANT CHANGE UP (ref 11.5–15.5)
MCHC RBC-ENTMCNC: 30.1 PG — SIGNIFICANT CHANGE UP (ref 27–34)
MCHC RBC-ENTMCNC: 31.7 GM/DL — LOW (ref 32–36)
MCV RBC AUTO: 94.9 FL — SIGNIFICANT CHANGE UP (ref 80–100)
PLATELET # BLD AUTO: 250 K/UL — SIGNIFICANT CHANGE UP (ref 150–400)
POTASSIUM SERPL-MCNC: 3.5 MMOL/L — SIGNIFICANT CHANGE UP (ref 3.5–5.3)
POTASSIUM SERPL-SCNC: 3.5 MMOL/L — SIGNIFICANT CHANGE UP (ref 3.5–5.3)
PROT SERPL-MCNC: 6.1 G/DL — SIGNIFICANT CHANGE UP (ref 6–8.3)
RBC # BLD: 4.27 M/UL — SIGNIFICANT CHANGE UP (ref 3.8–5.2)
RBC # FLD: 13.6 % — SIGNIFICANT CHANGE UP (ref 10.3–14.5)
SODIUM SERPL-SCNC: 144 MMOL/L — SIGNIFICANT CHANGE UP (ref 135–145)
WBC # BLD: 9.2 K/UL — SIGNIFICANT CHANGE UP (ref 3.8–10.5)
WBC # FLD AUTO: 9.2 K/UL — SIGNIFICANT CHANGE UP (ref 3.8–10.5)

## 2017-12-18 PROCEDURE — 78226 HEPATOBILIARY SYSTEM IMAGING: CPT | Mod: 26

## 2017-12-18 RX ORDER — LEVOTHYROXINE SODIUM 125 MCG
50 TABLET ORAL DAILY
Qty: 0 | Refills: 0 | Status: DISCONTINUED | OUTPATIENT
Start: 2017-12-19 | End: 2017-12-19

## 2017-12-18 RX ORDER — PANTOPRAZOLE SODIUM 20 MG/1
40 TABLET, DELAYED RELEASE ORAL
Qty: 0 | Refills: 0 | Status: DISCONTINUED | OUTPATIENT
Start: 2017-12-18 | End: 2017-12-19

## 2017-12-18 RX ORDER — ALPRAZOLAM 0.25 MG
0.5 TABLET ORAL AT BEDTIME
Qty: 0 | Refills: 0 | Status: DISCONTINUED | OUTPATIENT
Start: 2017-12-18 | End: 2017-12-19

## 2017-12-18 RX ORDER — LEVOTHYROXINE SODIUM 125 MCG
25 TABLET ORAL DAILY
Qty: 0 | Refills: 0 | Status: DISCONTINUED | OUTPATIENT
Start: 2017-12-18 | End: 2017-12-18

## 2017-12-18 RX ORDER — TRAMADOL HYDROCHLORIDE 50 MG/1
50 TABLET ORAL AT BEDTIME
Qty: 0 | Refills: 0 | Status: DISCONTINUED | OUTPATIENT
Start: 2017-12-18 | End: 2017-12-19

## 2017-12-18 RX ORDER — TRAMADOL HYDROCHLORIDE 50 MG/1
50 TABLET ORAL DAILY
Qty: 0 | Refills: 0 | Status: DISCONTINUED | OUTPATIENT
Start: 2017-12-18 | End: 2017-12-19

## 2017-12-18 RX ORDER — ATORVASTATIN CALCIUM 80 MG/1
10 TABLET, FILM COATED ORAL AT BEDTIME
Qty: 0 | Refills: 0 | Status: DISCONTINUED | OUTPATIENT
Start: 2017-12-18 | End: 2017-12-19

## 2017-12-18 RX ORDER — ISOSORBIDE MONONITRATE 60 MG/1
30 TABLET, EXTENDED RELEASE ORAL DAILY
Qty: 0 | Refills: 0 | Status: DISCONTINUED | OUTPATIENT
Start: 2017-12-18 | End: 2017-12-19

## 2017-12-18 RX ORDER — ASPIRIN/CALCIUM CARB/MAGNESIUM 324 MG
81 TABLET ORAL DAILY
Qty: 0 | Refills: 0 | Status: DISCONTINUED | OUTPATIENT
Start: 2017-12-18 | End: 2017-12-19

## 2017-12-18 RX ADMIN — TRAMADOL HYDROCHLORIDE 50 MILLIGRAM(S): 50 TABLET ORAL at 21:03

## 2017-12-18 RX ADMIN — Medication 81 MILLIGRAM(S): at 21:06

## 2017-12-18 RX ADMIN — ATORVASTATIN CALCIUM 10 MILLIGRAM(S): 80 TABLET, FILM COATED ORAL at 21:04

## 2017-12-18 RX ADMIN — Medication 200 MILLIGRAM(S): at 06:57

## 2017-12-18 RX ADMIN — Medication 25 MICROGRAM(S): at 06:57

## 2017-12-18 RX ADMIN — Medication 0.5 MILLIGRAM(S): at 21:04

## 2017-12-18 RX ADMIN — Medication 2.5 MILLIGRAM(S): at 06:56

## 2017-12-18 RX ADMIN — TRAMADOL HYDROCHLORIDE 50 MILLIGRAM(S): 50 TABLET ORAL at 21:38

## 2017-12-18 NOTE — PROGRESS NOTE ADULT - SUBJECTIVE AND OBJECTIVE BOX
[facility]    Patient in bed, denies CP/SOB, reports constipation & poor PO intake      Vital Signs Last 24 Hrs  T(C): 36.7 (18 Dec 2017 16:43), Max: 36.9 (18 Dec 2017 07:40)  T(F): 98 (18 Dec 2017 16:43), Max: 98.4 (18 Dec 2017 07:40)  HR: 65 (18 Dec 2017 16:43) (54 - 73)  BP: 135/73 (18 Dec 2017 16:43) (128/76 - 156/78)  BP(mean): --  RR: 16 (18 Dec 2017 16:43) (16 - 16)  SpO2: 93% (18 Dec 2017 16:43) (93% - 94%)    Rhythm:  OM    Physical Exam    Mental Status:  A & O x 3  Neck:  no JVD  Heart:  RRR  Lungs:  clear B/L  Abdomen:  soft, NT  Extremities:  no edema  Skin:  no rash  Muscular/Skeletal:  moves all extremities    MEDICATIONS  (STANDING):  ALPRAZolam 0.5 milliGRAM(s) Oral at bedtime  aspirin enteric coated 81 milliGRAM(s) Oral daily  atorvastatin 10 milliGRAM(s) Oral at bedtime  isosorbide   mononitrate ER Tablet (IMDUR) 30 milliGRAM(s) Oral daily  levothyroxine 50 MICROGram(s) Oral daily  pantoprazole    Tablet 40 milliGRAM(s) Oral before breakfast  traMADol 50 milliGRAM(s) Oral at bedtime    MEDICATIONS  (PRN):  traMADol 50 milliGRAM(s) Oral daily PRN Mild Pain (1 - 3)      LABS:	 	    CARDIAC MARKERS:  Troponin I, Serum: .217 ng/mL (12-17 @ 11:16)  Troponin I, Serum: .209 ng/mL (12-17 @ 02:19)  Troponin I, Serum: .221 ng/mL (12-16 @ 19:19)  Troponin I, Serum: .023 ng/mL (12-16 @ 09:46)                                  12.8   9.2   )-----------( 250      ( 18 Dec 2017 07:53 )             40.5     12-18 @ 07:53  WBC  9.2  Hgb  12.8  HCT  40.5  PLT  250  12-17 @ 11:16  WBC  11.8  Hgb  13.6  HCT  43.3  PLT  270  12-16 @ 09:46  WBC  15.8  Hgb  14.2  HCT  44.7  PLT  280  12-16 @ 03:00  WBC  19.0  Hgb  15.9  HCT  50.2  PLT  317      12-18    144  |  109<H>  |  11  ----------------------------<  87  3.5   |  28  |  0.94    Ca    8.8      18 Dec 2017 07:53    TPro  6.1  /  Alb  3.1<L>  /  TBili  0.7  /  DBili  x   /  AST  19  /  ALT  15  /  AlkPhos  60  12-18 12-18 @ 07:53  BUN  11  Cr   0.94  Gluc 87  Na   144  K    3.5  Cl   109  CO2  28  Mg   --  12-17 @ 11:16  BUN  11  Cr   1.10  Gluc 99  Na   143  K    3.7  Cl   108  CO2  26  Mg   --  12-16 @ 03:00  BUN  24  Cr   1.20  Gluc 179  Na   136  K    4.3  Cl   101  CO2  20  Mg   --      proBNP:   Lipid Profile:   HgA1c:   TSH:         Radiology: ?    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

## 2017-12-18 NOTE — PROGRESS NOTE ADULT - SUBJECTIVE AND OBJECTIVE BOX
Afeb VS wnl  No pain . Tolerated clears liquids. No nausea  Passing flatus. No BM yet  Abd: nontneder, nondistended  WBC down to 9  HIDA scan normal.  P: regular diet; discharge tomorrow if continues to do well

## 2017-12-18 NOTE — PROGRESS NOTE ADULT - SUBJECTIVE AND OBJECTIVE BOX
Patient is a 89y old  Female who presents with a chief complaint of severe upper abdominal pain (18 Dec 2017 04:24)  feels better, hida neg      INTERVAL HPI/OVERNIGHT EVENTS:  T(C): 36.6 (12-18-17 @ 13:00), Max: 37.1 (12-17-17 @ 14:40)  HR: 73 (12-18-17 @ 13:00) (54 - 73)  BP: 156/78 (12-18-17 @ 13:00) (114/71 - 156/78)  RR: 16 (12-18-17 @ 13:00) (16 - 16)  SpO2: 94% (12-18-17 @ 13:00) (93% - 94%)  Wt(kg): --  I&O's Summary    17 Dec 2017 07:01  -  18 Dec 2017 07:00  --------------------------------------------------------  IN: 200 mL / OUT: 0 mL / NET: 200 mL    18 Dec 2017 07:01  -  18 Dec 2017 14:32  --------------------------------------------------------  IN: 240 mL / OUT: 0 mL / NET: 240 mL        LABS:                        12.8   9.2   )-----------( 250      ( 18 Dec 2017 07:53 )             40.5     12-18    144  |  109<H>  |  11  ----------------------------<  87  3.5   |  28  |  0.94    Ca    8.8      18 Dec 2017 07:53    TPro  6.1  /  Alb  3.1<L>  /  TBili  0.7  /  DBili  x   /  AST  19  /  ALT  15  /  AlkPhos  60  12-18                  MEDICATIONS  (STANDING):  ALPRAZolam 0.5 milliGRAM(s) Oral at bedtime  aspirin enteric coated 81 milliGRAM(s) Oral daily  atorvastatin 10 milliGRAM(s) Oral at bedtime  isosorbide   mononitrate ER Tablet (IMDUR) 30 milliGRAM(s) Oral daily  levothyroxine 50 MICROGram(s) Oral daily  pantoprazole    Tablet 40 milliGRAM(s) Oral before breakfast  traMADol 50 milliGRAM(s) Oral at bedtime    MEDICATIONS  (PRN):  traMADol 50 milliGRAM(s) Oral daily PRN Mild Pain (1 - 3)      REVIEW OF SYSTEMS:  CONSTITUTIONAL: No fever, weight loss, or fatigue  EYES: No eye pain, visual disturbances, or discharge  ENMT:  No difficulty hearing, tinnitus, vertigo; No sinus or throat pain  NECK: No pain or stiffness  RESPIRATORY: No cough, wheezing, chills or hemoptysis; No shortness of breath  CARDIOVASCULAR: No chest pain, palpitations, dizziness, or leg swelling  GASTROINTESTINAL: No abdominal or epigastric pain. No nausea, vomiting, or hematemesis; No diarrhea or constipation. No melena or hematochezia.  GENITOURINARY: No dysuria, frequency, hematuria, or incontinence  NEUROLOGICAL: No headaches, memory loss, loss of strength, numbness, or tremors  SKIN: No itching, burning, rashes, or lesions   LYMPH NODES: No enlarged glands  ENDOCRINE: No heat or cold intolerance; No hair loss  MUSCULOSKELETAL: No joint pain or swelling; No muscle, back, or extremity pain  PSYCHIATRIC: No depression, anxiety, mood swings, or difficulty sleeping  HEME/LYMPH: No easy bruising, or bleeding gums  ALLERY AND IMMUNOLOGIC: No hives or eczema    RADIOLOGY & ADDITIONAL TESTS:    Imaging Personally Reviewed:  [ ] YES  [ ] NO    Consultant(s) Notes Reviewed:  [ ] YES  [ ] NO    PHYSICAL EXAM:  GENERAL: NAD, well-groomed, well-developed  HEAD:  Atraumatic, Normocephalic  EYES: EOMI, PERRLA, conjunctiva and sclera clear  ENMT: No tonsillar erythema, exudates, or enlargement; Moist mucous membranes, Good dentition, No lesions  NECK: Supple, No JVD, Normal thyroid  NERVOUS SYSTEM:  Alert & Oriented X3, Good concentration; Motor Strength 5/5 B/L upper and lower extremities; DTRs 2+ intact and symmetric  CHEST/LUNG: Clear to percussion bilaterally; No rales, rhonchi, wheezing, or rubs  HEART: Regular rate and rhythm; No murmurs, rubs, or gallops  ABDOMEN: Soft, Nontender, Nondistended; Bowel sounds present  EXTREMITIES:  2+ Peripheral Pulses, No clubbing, cyanosis, or edema  LYMPH: No lymphadenopathy noted  SKIN: No rashes or lesions    Care Discussed with Consultants/Other Providers [ ] YES  [ ] NO

## 2017-12-19 ENCOUNTER — TRANSCRIPTION ENCOUNTER (OUTPATIENT)
Age: 82
End: 2017-12-19

## 2017-12-19 VITALS
HEART RATE: 78 BPM | OXYGEN SATURATION: 93 % | DIASTOLIC BLOOD PRESSURE: 62 MMHG | SYSTOLIC BLOOD PRESSURE: 96 MMHG | RESPIRATION RATE: 16 BRPM | TEMPERATURE: 98 F

## 2017-12-19 LAB
BLD GP AB SCN SERPL QL: SIGNIFICANT CHANGE UP
HCT VFR BLD CALC: 43.6 % — SIGNIFICANT CHANGE UP (ref 34.5–45)
HGB BLD-MCNC: 13.8 G/DL — SIGNIFICANT CHANGE UP (ref 11.5–15.5)
MCHC RBC-ENTMCNC: 30.3 PG — SIGNIFICANT CHANGE UP (ref 27–34)
MCHC RBC-ENTMCNC: 31.5 GM/DL — LOW (ref 32–36)
MCV RBC AUTO: 95.9 FL — SIGNIFICANT CHANGE UP (ref 80–100)
PLATELET # BLD AUTO: 264 K/UL — SIGNIFICANT CHANGE UP (ref 150–400)
RBC # BLD: 4.55 M/UL — SIGNIFICANT CHANGE UP (ref 3.8–5.2)
RBC # FLD: 13.5 % — SIGNIFICANT CHANGE UP (ref 10.3–14.5)
WBC # BLD: 10.2 K/UL — SIGNIFICANT CHANGE UP (ref 3.8–10.5)
WBC # FLD AUTO: 10.2 K/UL — SIGNIFICANT CHANGE UP (ref 3.8–10.5)

## 2017-12-19 PROCEDURE — 36600 WITHDRAWAL OF ARTERIAL BLOOD: CPT

## 2017-12-19 PROCEDURE — 96367 TX/PROPH/DG ADDL SEQ IV INF: CPT

## 2017-12-19 PROCEDURE — 87040 BLOOD CULTURE FOR BACTERIA: CPT

## 2017-12-19 PROCEDURE — 83690 ASSAY OF LIPASE: CPT

## 2017-12-19 PROCEDURE — 82803 BLOOD GASES ANY COMBINATION: CPT

## 2017-12-19 PROCEDURE — 80061 LIPID PANEL: CPT

## 2017-12-19 PROCEDURE — 78226 HEPATOBILIARY SYSTEM IMAGING: CPT

## 2017-12-19 PROCEDURE — 80076 HEPATIC FUNCTION PANEL: CPT

## 2017-12-19 PROCEDURE — 74020: CPT | Mod: 26

## 2017-12-19 PROCEDURE — 87086 URINE CULTURE/COLONY COUNT: CPT

## 2017-12-19 PROCEDURE — 93005 ELECTROCARDIOGRAM TRACING: CPT

## 2017-12-19 PROCEDURE — 83605 ASSAY OF LACTIC ACID: CPT

## 2017-12-19 PROCEDURE — 80048 BASIC METABOLIC PNL TOTAL CA: CPT

## 2017-12-19 PROCEDURE — 82553 CREATINE MB FRACTION: CPT

## 2017-12-19 PROCEDURE — 86901 BLOOD TYPING SEROLOGIC RH(D): CPT

## 2017-12-19 PROCEDURE — 82150 ASSAY OF AMYLASE: CPT

## 2017-12-19 PROCEDURE — 86900 BLOOD TYPING SEROLOGIC ABO: CPT

## 2017-12-19 PROCEDURE — 85027 COMPLETE CBC AUTOMATED: CPT

## 2017-12-19 PROCEDURE — 74018 RADEX ABDOMEN 1 VIEW: CPT

## 2017-12-19 PROCEDURE — 96376 TX/PRO/DX INJ SAME DRUG ADON: CPT

## 2017-12-19 PROCEDURE — 84484 ASSAY OF TROPONIN QUANT: CPT

## 2017-12-19 PROCEDURE — 71275 CT ANGIOGRAPHY CHEST: CPT

## 2017-12-19 PROCEDURE — 76705 ECHO EXAM OF ABDOMEN: CPT

## 2017-12-19 PROCEDURE — 74174 CTA ABD&PLVS W/CONTRAST: CPT

## 2017-12-19 PROCEDURE — 86850 RBC ANTIBODY SCREEN: CPT

## 2017-12-19 PROCEDURE — 36415 COLL VENOUS BLD VENIPUNCTURE: CPT

## 2017-12-19 PROCEDURE — 71045 X-RAY EXAM CHEST 1 VIEW: CPT

## 2017-12-19 PROCEDURE — 96375 TX/PRO/DX INJ NEW DRUG ADDON: CPT

## 2017-12-19 PROCEDURE — A9537: CPT

## 2017-12-19 PROCEDURE — 96365 THER/PROPH/DIAG IV INF INIT: CPT | Mod: 59

## 2017-12-19 PROCEDURE — 99285 EMERGENCY DEPT VISIT HI MDM: CPT | Mod: 25

## 2017-12-19 PROCEDURE — 81001 URINALYSIS AUTO W/SCOPE: CPT

## 2017-12-19 PROCEDURE — 80053 COMPREHEN METABOLIC PANEL: CPT

## 2017-12-19 PROCEDURE — 96372 THER/PROPH/DIAG INJ SC/IM: CPT

## 2017-12-19 PROCEDURE — 82550 ASSAY OF CK (CPK): CPT

## 2017-12-19 PROCEDURE — 74020: CPT

## 2017-12-19 PROCEDURE — 74177 CT ABD & PELVIS W/CONTRAST: CPT

## 2017-12-19 PROCEDURE — 93306 TTE W/DOPPLER COMPLETE: CPT

## 2017-12-19 RX ORDER — HEPARIN SODIUM 5000 [USP'U]/ML
5000 INJECTION INTRAVENOUS; SUBCUTANEOUS EVERY 12 HOURS
Qty: 0 | Refills: 0 | Status: DISCONTINUED | OUTPATIENT
Start: 2017-12-19 | End: 2017-12-19

## 2017-12-19 RX ADMIN — Medication 50 MICROGRAM(S): at 05:49

## 2017-12-19 RX ADMIN — PANTOPRAZOLE SODIUM 40 MILLIGRAM(S): 20 TABLET, DELAYED RELEASE ORAL at 05:49

## 2017-12-19 RX ADMIN — Medication 10 MILLIGRAM(S): at 10:00

## 2017-12-19 RX ADMIN — Medication 81 MILLIGRAM(S): at 12:26

## 2017-12-19 RX ADMIN — ISOSORBIDE MONONITRATE 30 MILLIGRAM(S): 60 TABLET, EXTENDED RELEASE ORAL at 12:26

## 2017-12-19 RX ADMIN — HEPARIN SODIUM 5000 UNIT(S): 5000 INJECTION INTRAVENOUS; SUBCUTANEOUS at 17:38

## 2017-12-19 NOTE — PROGRESS NOTE ADULT - PROBLEM SELECTOR PLAN 1
resolving; likely ileus  diet advanced; pt tolerating well  no BM since Friday.  Consider miralax/colace resolving; likely ileus  diet advanced; pt tolerating well  no BM since Friday.  dulcolax supp

## 2017-12-19 NOTE — PROGRESS NOTE ADULT - SUBJECTIVE AND OBJECTIVE BOX
Just had large BM with relief of much of her pain, some pain persists.  Now complains she is "freezing".  Will get CBC

## 2017-12-19 NOTE — DISCHARGE NOTE ADULT - PATIENT PORTAL LINK FT
“You can access the [facility] Patient Portal, offered by [facility], by registering with the following website: [email or web address]”

## 2017-12-19 NOTE — DISCHARGE NOTE ADULT - MEDICATION SUMMARY - MEDICATIONS TO TAKE
I will START or STAY ON the medications listed below when I get home from the hospital:    vitamin d  -- 1 tab(s) orally  -- Indication: For HOME MEDICATION     Aspirin Low Dose 81 mg oral delayed release tablet  -- 1 tab(s) by mouth once a day  -- Indication: For HOME MEDICATION     traMADol 50 mg oral tablet  -- 1 tab(s) by mouth every 8 hours, As Needed  -- Indication: For HOME MEDICATION     isosorbide mononitrate 30 mg oral tablet, extended release  -- 1 tab(s) by mouth once a day (in the morning)  -- Indication: For HOME MEDICATION     pravastatin 40 mg oral tablet  -- 1 tab(s) by mouth once a day  -- Indication: For HOME MEDICATION     Xanax 0.5 mg oral tablet  -- 1 tab(s) by mouth 3 times a day, As Needed  -- Indication: For HOME MEDICATION     metoprolol succinate 25 mg oral tablet, extended release  -- 1 tab(s) by mouth once a day  -- Indication: For HOME MEDICATION     MiraLax oral powder for reconstitution  -- 17 gram(s) by mouth once a day  -- Indication: For HOME MEDICATION     omeprazole 20 mg oral delayed release capsule  -- 1 cap(s) by mouth once a day  -- Indication: For HOME MEDICATION     Synthroid 50 mcg (0.05 mg) oral tablet  -- 1 tab(s) by mouth once a day  -- Indication: For HOME MEDICATION

## 2017-12-19 NOTE — PROGRESS NOTE ADULT - PROBLEM SELECTOR PROBLEM 2
CAD (coronary artery disease)
Hypothyroidism, unspecified type

## 2017-12-19 NOTE — PROGRESS NOTE ADULT - ASSESSMENT
1) ileus/sbo--resolved    2) atypical chest pain    3) cad (stents)    4) hypothyroidism  continue present meds    5) hyperchol  continue present meds    6) +trop due to demand ischemia

## 2017-12-19 NOTE — PROGRESS NOTE ADULT - PROBLEM SELECTOR PLAN 2
+ troponin, suspect demand ischemia  plan med Rx for cardiac issues at this time
continue po synthroid
iv synthroid
resume po synthroid

## 2017-12-19 NOTE — DISCHARGE NOTE ADULT - HOSPITAL COURSE
88 YO FEMALE PRESENTED WITH N/V. ABDOMINAL PAIN  CT ABDOMEN AND PELVIS WAS CONSISTENT WITH  SMALL BOWEL OBSTRUCTION  AND QUESTIONABLE ABDOMINA MASS.  SEEN BY CARDIOLOGY FOR CHEST/ BACK PAIN, , A CT CHEST WAS DONE WITH REPEAT ABDOMINAL CT WHICH AT THIS POINT DID NOT CLEARLY  SHOWED A MASS, RESOLVED SMALL BOWEL OBSTRUCTION HOWEVER WAS POSITIVE FOR THICKENED GALLBLADDER WALL.   SHE   WAS STARTED ON CLEAR LIQUID DIET .   A SONOGRAM OF GALLBLADDER CONFIRMED THE THICKENED GALLBLADDER WALL, A HIDA SCAN WAS SUGGESTED.   HIDA SCAN WAS NORMAL.  PATIENT TOLERATE REGULAR  DIET AND WAS DISCHARGED HOME.

## 2017-12-19 NOTE — PROGRESS NOTE ADULT - SUBJECTIVE AND OBJECTIVE BOX
Pt feeling much better after having 1 very large BM followed by 2 loose stools.  Wants to eat. Little discomfort  Abd: soft, nontender, nondistended  WBC 10.2  AXR: gas mostly in colon  P: family will bring in food (pt does not want hospital food)  Will go home after 8 PM if no symptoms after eating

## 2017-12-19 NOTE — PROGRESS NOTE ADULT - SUBJECTIVE AND OBJECTIVE BOX
Afeb  VS wnl  No pain until 9 AM today when she had onset of lower abd pain 20 minutes after breakfast.  Passed flatus yesterday, not today.  No BM  Abd: mild lower abd tenderness with mild distention  P: AXR, repeat CT if necessary

## 2017-12-19 NOTE — PROGRESS NOTE ADULT - SUBJECTIVE AND OBJECTIVE BOX
Patient is a 89y old  Female who presents with a chief complaint of severe upper abdominal pain (18 Dec 2017 04:24)      INTERVAL HPI/OVERNIGHT EVENTS: Patient is a 89y old  Female who presents with a chief complaint of severe upper abdominal pain (18 Dec 2017 04:24).  HIDA yesterday (-). Diet advanced.  Pt reports feeling well.  No pain.  Decreased PO intake as she does not like hospital food.  No BM yet, requesting miralax.     MEDICATIONS  (STANDING):  ALPRAZolam 0.5 milliGRAM(s) Oral at bedtime  aspirin enteric coated 81 milliGRAM(s) Oral daily  atorvastatin 10 milliGRAM(s) Oral at bedtime  isosorbide   mononitrate ER Tablet (IMDUR) 30 milliGRAM(s) Oral daily  levothyroxine 50 MICROGram(s) Oral daily  pantoprazole    Tablet 40 milliGRAM(s) Oral before breakfast  traMADol 50 milliGRAM(s) Oral at bedtime    MEDICATIONS  (PRN):  traMADol 50 milliGRAM(s) Oral daily PRN Mild Pain (1 - 3)      Allergies    codeine (Unknown)  penicillin (Unknown)    Intolerances        REVIEW OF SYSTEMS:  CONSTITUTIONAL: No fever, No chills, No fatigue, No myalgia, No Body ache  EYES: No eye pain, visual disturbances, or discharge  ENMT:  No ear pain, No nose bleed, No vertigo; No sinus or throat pain  NECK: No pain, No stiffness  RESPIRATORY: No cough, wheezing, No  hemoptysis; No shortness of breath  CARDIOVASCULAR: No chest pain, palpitations, leg swelling  GASTROINTESTINAL: No abdominal or epigastric pain. No nausea, No vomiting; No diarrhea; (+) constipation. [- ] BM  GENITOURINARY: No dysuria, No frequency, No urgency, No hematuria, or incontinence  NEUROLOGICAL: alert and oriented x 3,  No headaches, No dizziness, No numbness,  SKIN:   No itching, burning, rashes, or lesions   MUSCULOSKELETAL: No joint pain or swelling; No muscle pain, No back pain, No extremity pain  PSYCHIATRIC: No depression, anxiety, mood swings, or difficulty sleeping  ROS  [ ] Unable to obtain   REST OF REVIEW Of SYSTEM - [x ] Normal     Height (cm): 165.1 (12-16 @ 02:19)  Weight (kg): 64.9 (12-16 @ 02:19)  BMI (kg/m2): 23.8 (12-16 @ 02:19)  BSA (m2): 1.72 (12-16 @ 02:19)  Vital Signs Last 24 Hrs  T(C): 36.6 (19 Dec 2017 07:18), Max: 36.8 (18 Dec 2017 23:34)  T(F): 97.9 (19 Dec 2017 07:18), Max: 98.2 (18 Dec 2017 23:34)  HR: 52 (19 Dec 2017 07:18) (52 - 73)  BP: 114/67 (19 Dec 2017 07:18) (114/67 - 156/78)  BP(mean): --  RR: 16 (19 Dec 2017 07:18) (16 - 16)  SpO2: 93% (19 Dec 2017 07:18) (92% - 95%)  [ ] room air   [ ] 02    PHYSICAL EXAM:  GENERAL:  No acute distresss,  [ ] Agitated, [ ] Lethargy, [ ] confused   HEAD:  normal  ENMT: normal  NECK:  normal    NERVOUS SYSTEM:  Alert & Oriented X3, no focal deficits [ ]Confusion  [ ] Encephalopathic [ ] Sedated [ ] Other  CHEST/LUNG: Clear to auscultation bilaterally,  [ ] decreased breath sounds at bases  [ ] wheezing   [ ] rhonchi  [ ] crackles  HEART:  Regular rate and rhythm, No murmurs, rubs, or gallops,  [ ] irregular   ABDOMEN:  soft, nontender, nondistended, positive bowel sounds x4.   [ ] obese  EXTREMITIES: No clubbing, cyanosis or edema  SKIN: [ ] venous stasis skin changes    LABS:      Ca    8.8        18 Dec 2017 07:53            CAPILLARY BLOOD GLUCOSE        Cultures  Culture Results:   No growth to date. (12-16 @ 10:24)  Culture Results:   No growth to date. (12-16 @ 10:04)  Culture Results:   <10,000 CFU/ml Normal Urogenital marisa present (12-16 @ 10:02)          RADIOLOGY & ADDITIONAL TESTS:      Care Discussed with [X] Consultants  [ ] Patient  [ ] Family  [X]   /   [ ] Other; RN  DVT prophylaxis [ ] lovenox   [ ] subq heparin  [ ] coumadin  [ ] venodynes [ x] ambulating frequently at low risk for vte and no pharm         or  mechanical prophylaxis required    [ ] other   Advanced directive:    [x ]pt has hcp     [ ] pt declined to assign hcp  Discussed with pt @ bedside

## 2017-12-19 NOTE — PROGRESS NOTE ADULT - SUBJECTIVE AND OBJECTIVE BOX
The patient is tolerating po.  No abdominal pain.    MEDICATIONS  (STANDING):  ALPRAZolam 0.5 milliGRAM(s) Oral at bedtime  aspirin enteric coated 81 milliGRAM(s) Oral daily  atorvastatin 10 milliGRAM(s) Oral at bedtime  heparin  Injectable 5000 Unit(s) SubCutaneous every 12 hours  isosorbide   mononitrate ER Tablet (IMDUR) 30 milliGRAM(s) Oral daily  levothyroxine 50 MICROGram(s) Oral daily  pantoprazole    Tablet 40 milliGRAM(s) Oral before breakfast  traMADol 50 milliGRAM(s) Oral at bedtime      PHYSICAL EXAM:  Daily   T(C): 36.7 (12-19-17 @ 16:13), Max: 36.8 (12-18-17 @ 23:34)  HR: 78 (12-19-17 @ 16:13) (52 - 78)  BP: 96/62 (12-19-17 @ 16:13) (96/62 - 137/77)  RR: 16 (12-19-17 @ 16:13) (16 - 16)  SpO2: 93% (12-19-17 @ 16:13) (92% - 96%)  Wt(kg): --  I&O's Summary    18 Dec 2017 07:01  -  19 Dec 2017 07:00  --------------------------------------------------------  IN: 240 mL / OUT: 0 mL / NET: 240 mL        Appearance: Normal	  HEENT:   Normal oral mucosa, PERRL, EOMI	  Lymphatic: No lymphadenopathy  Cardiovascular: Normal S1 S2, No JVD, No murmurs, No edema  Respiratory: Lungs clear to auscultation	  Psychiatry: A & O x 3, Mood & affect appropriate  Gastrointestinal:  Soft, Non-tender, + BS	  Skin: No rashes, No ecchymoses, No cyanosis	  Neurologic: Non-focal  Extremities: Normal range of motion, No clubbing, cyanosis or edema  Vascular: Peripheral pulses palpable 2+ bilaterally      	  LABS:	 	                          13.8   10.2  )-----------( 264      ( 19 Dec 2017 11:21 )             43.6     12-18    144  |  109<H>  |  11  ----------------------------<  87  3.5   |  28  |  0.94    Ca    8.8      18 Dec 2017 07:53    TPro  6.1  /  Alb  3.1<L>  /  TBili  0.7  /  DBili  x   /  AST  19  /  ALT  15  /  AlkPhos  60  12-18          ASSESSMENT/PLAN:

## 2017-12-19 NOTE — DISCHARGE NOTE ADULT - CARE PLAN
Goal:	RETURN TO  DAILY ACTIVITIES  Instructions for follow-up, activity and diet:	CALL DR. GONZALEZ IF ANY NAUSEA, VOMITING , ABDOMINAL PAIN Principal Discharge DX:	SBO (small bowel obstruction)  Goal:	RETURN TO  DAILY ACTIVITIES  Instructions for follow-up, activity and diet:	CALL DR. GONZALEZ IF ANY NAUSEA, VOMITING , ABDOMINAL PAIN

## 2017-12-21 LAB
CULTURE RESULTS: SIGNIFICANT CHANGE UP
CULTURE RESULTS: SIGNIFICANT CHANGE UP
SPECIMEN SOURCE: SIGNIFICANT CHANGE UP
SPECIMEN SOURCE: SIGNIFICANT CHANGE UP

## 2020-10-21 NOTE — PATIENT PROFILE ADULT. - NS PRO OT REFERRAL QUES 2 YN
Patient states when she was in office for stress test she was very SOB. Was asked if patient is on oxygen. Patients states cardiology staff suggested patient to discuss at home oxygen with pcp. Patient has pending appointment with PCP 11/03/2020.    no

## 2021-05-17 NOTE — DISCHARGE NOTE ADULT - NSTOBACCOREFERRAL_GEN_A_CS
Airway patent, Nasal mucosa clear. Mouth with normal mucosa. Throat has no vesicles, no oropharyngeal exudates and uvula is midline.
Patient declined information

## 2025-02-05 NOTE — ED PROVIDER NOTE - EYES, MLM
Spoke with patient rescheduling her appointment with Dr Florez for 3/21/25 @ 3:30pm    Clear bilaterally, pupils equal, round and reactive to light.